# Patient Record
Sex: FEMALE | Race: BLACK OR AFRICAN AMERICAN | Employment: FULL TIME | URBAN - METROPOLITAN AREA
[De-identification: names, ages, dates, MRNs, and addresses within clinical notes are randomized per-mention and may not be internally consistent; named-entity substitution may affect disease eponyms.]

---

## 2018-03-01 ENCOUNTER — HOSPITAL ENCOUNTER (EMERGENCY)
Age: 23
Discharge: HOME | End: 2018-03-01
Attending: EMERGENCY MEDICINE
Payer: SELF-PAY

## 2018-03-01 ENCOUNTER — APPOINTMENT (OUTPATIENT)
Dept: GENERAL RADIOLOGY | Age: 23
End: 2018-03-01
Attending: PHYSICIAN ASSISTANT
Payer: SELF-PAY

## 2018-03-01 VITALS
BODY MASS INDEX: 27.32 KG/M2 | OXYGEN SATURATION: 100 % | TEMPERATURE: 97.3 F | HEART RATE: 87 BPM | RESPIRATION RATE: 16 BRPM | SYSTOLIC BLOOD PRESSURE: 108 MMHG | WEIGHT: 164 LBS | HEIGHT: 65 IN | DIASTOLIC BLOOD PRESSURE: 70 MMHG

## 2018-03-01 DIAGNOSIS — M79.601 PAIN OF RIGHT UPPER EXTREMITY: Primary | ICD-10-CM

## 2018-03-01 DIAGNOSIS — T14.8XXA MUSCLE STRAIN: ICD-10-CM

## 2018-03-01 PROCEDURE — 73090 X-RAY EXAM OF FOREARM: CPT

## 2018-03-01 PROCEDURE — 99283 EMERGENCY DEPT VISIT LOW MDM: CPT

## 2018-03-01 PROCEDURE — 73060 X-RAY EXAM OF HUMERUS: CPT

## 2018-03-01 PROCEDURE — 74011250637 HC RX REV CODE- 250/637: Performed by: PHYSICIAN ASSISTANT

## 2018-03-01 RX ORDER — NAPROXEN 500 MG/1
500 TABLET ORAL 2 TIMES DAILY WITH MEALS
Qty: 20 TAB | Refills: 0 | Status: SHIPPED | OUTPATIENT
Start: 2018-03-01 | End: 2018-08-19

## 2018-03-01 RX ORDER — CYCLOBENZAPRINE HCL 5 MG
5 TABLET ORAL
Qty: 7 TAB | Refills: 0 | Status: SHIPPED | OUTPATIENT
Start: 2018-03-01 | End: 2018-08-19

## 2018-03-01 RX ORDER — HYDROCODONE BITARTRATE AND ACETAMINOPHEN 5; 325 MG/1; MG/1
1 TABLET ORAL
Status: COMPLETED | OUTPATIENT
Start: 2018-03-01 | End: 2018-03-01

## 2018-03-01 RX ADMIN — HYDROCODONE BITARTRATE AND ACETAMINOPHEN 1 TABLET: 5; 325 TABLET ORAL at 13:13
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
## 2018-03-01 NOTE — LETTER
[facility] EMERGENCY DEPT 
[id number] [phone] 
[phone] Work/School Note Date: 3/1/2018 To Whom It May concern: 
 
Juan Hubbard was seen and treated today in the emergency room by the following provider(s): 
Attending Provider: Stephens Cowden, DO Physician Assistant: TANK Monroy. Juan Hubbard may return to work on 3/3/18. Sincerely, 
 
 
 
 
[facility]